# Patient Record
(demographics unavailable — no encounter records)

---

## 2025-03-10 NOTE — HISTORY OF PRESENT ILLNESS
[FreeTextEntry1] :     03/10/2025. Michelle Dutton 27 year old female G0 3/5/25  presents for initial visit to [facility] care.  She feels well and offers no complaints. She has monthly menses, not too heavy or painful. She denies intermenstrual bleeding, abn discharge or vaginitis sxs. No urinary complaints. She has normal BM, no bloody stool. She denies abdominal or pelvic pain.  Not regularly sexually active Completed Gardasil series  Denies changes in medical status, medications, serious illness, hospitalizations, and surgeries.  OBHx: G0  GynHx:  12/2022 ACSCUS/HPV+ 1/2023 Colpo neg 12/2023 pap neg 6/2024 pap neg  No Hx of STI, fibroids, ovarian cysts, abnl paps, or pelvic infections.  PMH: anxiety, beta-thal minor  SHx: wisdom teeth extraction  Meds: multivitamin  All: NKDA   Soc: No alcohol, drug, or tobacco use, non-smoker.   FHx:  Mother with rectal cancer Father leukemia  Denies FHx of breast, ovarian, uterine or colon cancer.

## 2025-03-10 NOTE — PLAN
[FreeTextEntry1] : 27 year old female presents for routine gyn exam   BSE taught Breast and pelvic exam performed Pap/HPV due 6/2025  STI screen performed  Conctraception - not regularly active - discussed condoms and can return for contraception counseling if needed  RTO in 6 mos for annual or PRN

## 2025-04-22 NOTE — ASSESSMENT
[FreeTextEntry1] : Alert, oriented, well, pleasant.  erythematous papules cheeks, few. Post inflammatory erythematous macules face.  Trunk clear. Denies h/o teenage acne. Began few years ago. No medical treatments. Acne non-comedogenic products. Dove bar soap. start clindamycin solution twice per day. f/u 2 months.  Bump present since childhood. Maybe a little bigger. 3mm brown papule mid inferior occipital scalp. Nevus. No treatment.  f/u if increased size or symptomatic.  sun protection reviewed.